# Patient Record
Sex: MALE | Race: WHITE | Employment: OTHER | URBAN - METROPOLITAN AREA
[De-identification: names, ages, dates, MRNs, and addresses within clinical notes are randomized per-mention and may not be internally consistent; named-entity substitution may affect disease eponyms.]

---

## 2020-01-01 ENCOUNTER — TRANSFERRED RECORDS (OUTPATIENT)
Dept: HEALTH INFORMATION MANAGEMENT | Facility: CLINIC | Age: 55
End: 2020-01-01

## 2020-01-01 ENCOUNTER — APPOINTMENT (OUTPATIENT)
Dept: CT IMAGING | Facility: CLINIC | Age: 55
DRG: 070 | End: 2020-01-01
Attending: INTERNAL MEDICINE
Payer: COMMERCIAL

## 2020-01-01 ENCOUNTER — PATIENT OUTREACH (OUTPATIENT)
Dept: CARE COORDINATION | Facility: CLINIC | Age: 55
End: 2020-01-01

## 2020-01-01 ENCOUNTER — APPOINTMENT (OUTPATIENT)
Dept: CT IMAGING | Facility: CLINIC | Age: 55
DRG: 070 | End: 2020-01-01
Attending: STUDENT IN AN ORGANIZED HEALTH CARE EDUCATION/TRAINING PROGRAM
Payer: COMMERCIAL

## 2020-01-01 ENCOUNTER — ANCILLARY PROCEDURE (OUTPATIENT)
Dept: CARDIOLOGY | Facility: CLINIC | Age: 55
DRG: 070 | End: 2020-01-01
Attending: INTERNAL MEDICINE
Payer: COMMERCIAL

## 2020-01-01 ENCOUNTER — APPOINTMENT (OUTPATIENT)
Dept: GENERAL RADIOLOGY | Facility: CLINIC | Age: 55
DRG: 070 | End: 2020-01-01
Attending: INTERNAL MEDICINE
Payer: COMMERCIAL

## 2020-01-01 ENCOUNTER — HOSPITAL ENCOUNTER (INPATIENT)
Facility: CLINIC | Age: 55
LOS: 1 days | Discharge: HOME | DRG: 070 | End: 2020-04-18
Attending: INTERNAL MEDICINE | Admitting: INTERNAL MEDICINE
Payer: COMMERCIAL

## 2020-01-01 VITALS
WEIGHT: 187.7 LBS | TEMPERATURE: 97.6 F | HEART RATE: 95 BPM | DIASTOLIC BLOOD PRESSURE: 79 MMHG | SYSTOLIC BLOOD PRESSURE: 101 MMHG | RESPIRATION RATE: 18 BRPM | OXYGEN SATURATION: 91 %

## 2020-01-01 DIAGNOSIS — L03.90 CELLULITIS, UNSPECIFIED CELLULITIS SITE: ICD-10-CM

## 2020-01-01 DIAGNOSIS — E11.9 TYPE 2 DIABETES MELLITUS WITHOUT COMPLICATION, UNSPECIFIED WHETHER LONG TERM INSULIN USE (H): Primary | ICD-10-CM

## 2020-01-01 DIAGNOSIS — R06.02 SHORTNESS OF BREATH: ICD-10-CM

## 2020-01-01 LAB
ABO + RH BLD: NORMAL
ABO + RH BLD: NORMAL
ACETAMINOPHEN QUAL: NEGATIVE
ALBUMIN SERPL-MCNC: 3 G/DL (ref 3.4–5)
ALBUMIN UR-MCNC: NEGATIVE MG/DL
ALP SERPL-CCNC: 143 U/L (ref 40–150)
ALT SERPL W P-5'-P-CCNC: 12 U/L (ref 0–70)
AMANTADINE: NEGATIVE
AMITRIPTYLINE QUAL: NEGATIVE
AMOXAPINE: NEGATIVE
AMPHETAMINES QUAL: NEGATIVE
ANION GAP SERPL CALCULATED.3IONS-SCNC: 2 MMOL/L (ref 3–14)
ANION GAP SERPL CALCULATED.3IONS-SCNC: 3 MMOL/L (ref 3–14)
ANION GAP SERPL CALCULATED.3IONS-SCNC: 6 MMOL/L (ref 3–14)
APPEARANCE UR: CLEAR
AST SERPL W P-5'-P-CCNC: 22 U/L (ref 0–45)
ATROPINE: NEGATIVE
BACTERIA SPEC CULT: NO GROWTH
BACTERIA SPEC CULT: NO GROWTH
BASE EXCESS BLDA CALC-SCNC: 11.3 MMOL/L
BASE EXCESS BLDV CALC-SCNC: 13.3 MMOL/L
BASOPHILS # BLD AUTO: 0.1 10E9/L (ref 0–0.2)
BASOPHILS NFR BLD AUTO: 1.7 %
BENZODIAZ UR QL: NEGATIVE
BILIRUB SERPL-MCNC: 0.5 MG/DL (ref 0.2–1.3)
BILIRUB UR QL STRIP: NEGATIVE
BLD GP AB SCN SERPL QL: NORMAL
BLOOD BANK CMNT PATIENT-IMP: NORMAL
BUN SERPL-MCNC: 20 MG/DL (ref 7–30)
BUN SERPL-MCNC: 22 MG/DL (ref 7–30)
BUN SERPL-MCNC: 29 MG/DL (ref 7–30)
BUPROPION QUAL: NEGATIVE
CAFFEINE QUAL: POSITIVE
CALCIUM SERPL-MCNC: 8.4 MG/DL (ref 8.5–10.1)
CALCIUM SERPL-MCNC: 8.6 MG/DL (ref 8.5–10.1)
CALCIUM SERPL-MCNC: 8.7 MG/DL (ref 8.5–10.1)
CANNABINOIDS UR QL SCN: NEGATIVE
CARBAMAZEPINE QUAL: NEGATIVE
CHLORIDE SERPL-SCNC: 100 MMOL/L (ref 94–109)
CHLORIDE SERPL-SCNC: 98 MMOL/L (ref 94–109)
CHLORIDE SERPL-SCNC: 99 MMOL/L (ref 94–109)
CHLORPHENIRAMINE: NEGATIVE
CHLORPROMAZINE: NEGATIVE
CITALOPRAM QUAL: NEGATIVE
CLOMIPRAMINE QUAL: NEGATIVE
CO2 SERPL-SCNC: 32 MMOL/L (ref 20–32)
CO2 SERPL-SCNC: 36 MMOL/L (ref 20–32)
CO2 SERPL-SCNC: 36 MMOL/L (ref 20–32)
COCAINE QUAL: NEGATIVE
COCAINE UR QL: NEGATIVE
CODEINE QUAL: NEGATIVE
COLOR UR AUTO: ABNORMAL
CREAT SERPL-MCNC: 1.28 MG/DL (ref 0.66–1.25)
CREAT SERPL-MCNC: 1.33 MG/DL (ref 0.66–1.25)
CREAT SERPL-MCNC: 1.47 MG/DL (ref 0.66–1.25)
CRP SERPL-MCNC: 20 MG/L (ref 0–8)
DESIPRAMINE QUAL: NEGATIVE
DEXTROMETHORPHAN: NEGATIVE
DIFFERENTIAL METHOD BLD: NORMAL
DIPHENHYDRAMINE: POSITIVE
DOXEPIN/METABOLITE: NEGATIVE
DOXYLAMINE: NEGATIVE
EJECTION FRACTION: NORMAL %
EOSINOPHIL # BLD AUTO: 0.1 10E9/L (ref 0–0.7)
EOSINOPHIL NFR BLD AUTO: 1.2 %
EPHEDRINE OR PSEUDO: NEGATIVE
ERYTHROCYTE [DISTWIDTH] IN BLOOD BY AUTOMATED COUNT: 17 % (ref 10–15)
ERYTHROCYTE [DISTWIDTH] IN BLOOD BY AUTOMATED COUNT: 17.2 % (ref 10–15)
FENTANYL QUAL: NEGATIVE
FERRITIN SERPL-MCNC: 44 NG/ML (ref 26–388)
FLUOXETINE AND METAB: NEGATIVE
GFR SERPL CREATININE-BSD FRML MDRD: 52 ML/MIN/{1.73_M2}
GFR SERPL CREATININE-BSD FRML MDRD: 59 ML/MIN/{1.73_M2}
GFR SERPL CREATININE-BSD FRML MDRD: 62 ML/MIN/{1.73_M2}
GLUCOSE BLDC GLUCOMTR-MCNC: 163 MG/DL (ref 70–99)
GLUCOSE BLDC GLUCOMTR-MCNC: 201 MG/DL (ref 70–99)
GLUCOSE BLDC GLUCOMTR-MCNC: 234 MG/DL (ref 70–99)
GLUCOSE BLDC GLUCOMTR-MCNC: 254 MG/DL (ref 70–99)
GLUCOSE BLDC GLUCOMTR-MCNC: 56 MG/DL (ref 70–99)
GLUCOSE BLDC GLUCOMTR-MCNC: 87 MG/DL (ref 70–99)
GLUCOSE SERPL-MCNC: 209 MG/DL (ref 70–99)
GLUCOSE SERPL-MCNC: 226 MG/DL (ref 70–99)
GLUCOSE SERPL-MCNC: 96 MG/DL (ref 70–99)
GLUCOSE UR STRIP-MCNC: NEGATIVE MG/DL
HCO3 BLD-SCNC: 37 MMOL/L (ref 21–28)
HCO3 BLDV-SCNC: 38 MMOL/L (ref 21–28)
HCT VFR BLD AUTO: 39.7 % (ref 40–53)
HCT VFR BLD AUTO: 40.9 % (ref 40–53)
HGB BLD-MCNC: 11.7 G/DL (ref 13.3–17.7)
HGB BLD-MCNC: 12 G/DL (ref 13.3–17.7)
HGB UR QL STRIP: ABNORMAL
HYDROCODONE QUAL: NEGATIVE
HYDROMORPHONE QUAL: NEGATIVE
IBUPROFEN QUAL: NEGATIVE
IMIPRAMINE QUAL: NEGATIVE
IMM GRANULOCYTES # BLD: 0 10E9/L (ref 0–0.4)
IMM GRANULOCYTES NFR BLD: 0.4 %
INR PPP: 2.24 (ref 0.86–1.14)
INR PPP: 2.4 (ref 0.86–1.14)
INTERPRETATION ECG - MUSE: NORMAL
KETAMINE QUAL: NEGATIVE
KETONES UR STRIP-MCNC: NEGATIVE MG/DL
LACTATE BLD-SCNC: 0.7 MMOL/L (ref 0.7–2)
LACTATE BLD-SCNC: 1.3 MMOL/L (ref 0.7–2)
LAMOTRIGINE QUAL: NEGATIVE
LEUKOCYTE ESTERASE UR QL STRIP: NEGATIVE
LIDOCAIN SPEC QL: NEGATIVE
LOXAPINE: NEGATIVE
LYMPHOCYTES # BLD AUTO: 1.3 10E9/L (ref 0.8–5.3)
LYMPHOCYTES NFR BLD AUTO: 15.5 %
Lab: NORMAL
MAGNESIUM SERPL-MCNC: 1.8 MG/DL (ref 1.6–2.3)
MAGNESIUM SERPL-MCNC: 2 MG/DL (ref 1.6–2.3)
MAPROTYLINE: NEGATIVE
MCH RBC QN AUTO: 24.9 PG (ref 26.5–33)
MCH RBC QN AUTO: 25.1 PG (ref 26.5–33)
MCHC RBC AUTO-ENTMCNC: 29.3 G/DL (ref 31.5–36.5)
MCHC RBC AUTO-ENTMCNC: 29.5 G/DL (ref 31.5–36.5)
MCV RBC AUTO: 85 FL (ref 78–100)
MCV RBC AUTO: 86 FL (ref 78–100)
MDC_IDC_LEAD_IMPLANT_DT: NORMAL
MDC_IDC_LEAD_IMPLANT_DT: NORMAL
MDC_IDC_LEAD_LOCATION: NORMAL
MDC_IDC_LEAD_LOCATION: NORMAL
MDC_IDC_LEAD_LOCATION_DETAIL_1: NORMAL
MDC_IDC_LEAD_LOCATION_DETAIL_1: NORMAL
MDC_IDC_LEAD_MFG: NORMAL
MDC_IDC_LEAD_MFG: NORMAL
MDC_IDC_LEAD_MODEL: NORMAL
MDC_IDC_LEAD_MODEL: NORMAL
MDC_IDC_LEAD_POLARITY_TYPE: NORMAL
MDC_IDC_LEAD_POLARITY_TYPE: NORMAL
MDC_IDC_LEAD_SERIAL: NORMAL
MDC_IDC_LEAD_SERIAL: NORMAL
MDC_IDC_LEAD_SPECIAL_FUNCTION: NORMAL
MDC_IDC_MSMT_BATTERY_DTM: NORMAL
MDC_IDC_MSMT_BATTERY_REMAINING_LONGEVITY: 12 MO
MDC_IDC_MSMT_BATTERY_STATUS: NORMAL
MDC_IDC_MSMT_CAP_CHARGE_DTM: NORMAL
MDC_IDC_MSMT_CAP_CHARGE_ENERGY: 41 J
MDC_IDC_MSMT_CAP_CHARGE_TIME: 10.6
MDC_IDC_MSMT_CAP_CHARGE_TIME: 12.37 S
MDC_IDC_MSMT_CAP_CHARGE_TYPE: NORMAL
MDC_IDC_MSMT_CAP_CHARGE_TYPE: NORMAL
MDC_IDC_MSMT_LEADCHNL_LV_IMPEDANCE_VALUE: 863 OHM
MDC_IDC_MSMT_LEADCHNL_LV_PACING_THRESHOLD_AMPLITUDE: 0.7 V
MDC_IDC_MSMT_LEADCHNL_LV_PACING_THRESHOLD_PULSEWIDTH: 0.5 MS
MDC_IDC_MSMT_LEADCHNL_LV_SENSING_INTR_AMPL: 6.8 MV
MDC_IDC_MSMT_LEADCHNL_RA_IMPEDANCE_VALUE: 568 OHM
MDC_IDC_MSMT_LEADCHNL_RA_PACING_THRESHOLD_AMPLITUDE: 0.6 V
MDC_IDC_MSMT_LEADCHNL_RA_PACING_THRESHOLD_PULSEWIDTH: 0.5 MS
MDC_IDC_MSMT_LEADCHNL_RA_SENSING_INTR_AMPL: 4.8 MV
MDC_IDC_MSMT_LEADCHNL_RV_IMPEDANCE_VALUE: 472 OHM
MDC_IDC_MSMT_LEADCHNL_RV_PACING_THRESHOLD_AMPLITUDE: 0.9 V
MDC_IDC_MSMT_LEADCHNL_RV_PACING_THRESHOLD_PULSEWIDTH: 0.6 MS
MDC_IDC_MSMT_LEADCHNL_RV_SENSING_INTR_AMPL: 6.6 MV
MDC_IDC_PG_IMPLANT_DTM: NORMAL
MDC_IDC_PG_MFG: NORMAL
MDC_IDC_PG_MODEL: NORMAL
MDC_IDC_PG_SERIAL: NORMAL
MDC_IDC_PG_TYPE: NORMAL
MDC_IDC_SESS_CLINIC_NAME: NORMAL
MDC_IDC_SESS_DTM: NORMAL
MDC_IDC_SESS_TYPE: NORMAL
MDC_IDC_SET_BRADY_AT_MODE_SWITCH_MODE: NORMAL
MDC_IDC_SET_BRADY_AT_MODE_SWITCH_RATE: 170 {BEATS}/MIN
MDC_IDC_SET_BRADY_HYSTRATE: NORMAL
MDC_IDC_SET_BRADY_LOWRATE: 60 {BEATS}/MIN
MDC_IDC_SET_BRADY_MAX_SENSOR_RATE: 130 {BEATS}/MIN
MDC_IDC_SET_BRADY_MAX_TRACKING_RATE: 130 {BEATS}/MIN
MDC_IDC_SET_BRADY_MODE: NORMAL
MDC_IDC_SET_BRADY_PAV_DELAY_HIGH: 160 MS
MDC_IDC_SET_BRADY_PAV_DELAY_LOW: 160 MS
MDC_IDC_SET_BRADY_SAV_DELAY_HIGH: 160 MS
MDC_IDC_SET_BRADY_SAV_DELAY_LOW: 160 MS
MDC_IDC_SET_CRT_LVRV_DELAY: 70 MS
MDC_IDC_SET_CRT_PACED_CHAMBERS: NORMAL
MDC_IDC_SET_LEADCHNL_LV_PACING_AMPLITUDE: 2.5 V
MDC_IDC_SET_LEADCHNL_LV_PACING_ANODE_ELECTRODE_1: NORMAL
MDC_IDC_SET_LEADCHNL_LV_PACING_ANODE_LOCATION_1: NORMAL
MDC_IDC_SET_LEADCHNL_LV_PACING_CAPTURE_MODE: NORMAL
MDC_IDC_SET_LEADCHNL_LV_PACING_CATHODE_ELECTRODE_1: NORMAL
MDC_IDC_SET_LEADCHNL_LV_PACING_CATHODE_LOCATION_1: NORMAL
MDC_IDC_SET_LEADCHNL_LV_PACING_POLARITY: NORMAL
MDC_IDC_SET_LEADCHNL_LV_PACING_PULSEWIDTH: 0.5 MS
MDC_IDC_SET_LEADCHNL_LV_SENSING_ADAPTATION_MODE: NORMAL
MDC_IDC_SET_LEADCHNL_LV_SENSING_ANODE_ELECTRODE_1: NORMAL
MDC_IDC_SET_LEADCHNL_LV_SENSING_ANODE_LOCATION_1: NORMAL
MDC_IDC_SET_LEADCHNL_LV_SENSING_CATHODE_ELECTRODE_1: NORMAL
MDC_IDC_SET_LEADCHNL_LV_SENSING_CATHODE_LOCATION_1: NORMAL
MDC_IDC_SET_LEADCHNL_LV_SENSING_POLARITY: NORMAL
MDC_IDC_SET_LEADCHNL_LV_SENSING_SENSITIVITY: 1 MV
MDC_IDC_SET_LEADCHNL_RA_PACING_AMPLITUDE: 2 V
MDC_IDC_SET_LEADCHNL_RA_PACING_CAPTURE_MODE: NORMAL
MDC_IDC_SET_LEADCHNL_RA_PACING_POLARITY: NORMAL
MDC_IDC_SET_LEADCHNL_RA_PACING_PULSEWIDTH: 0.5 MS
MDC_IDC_SET_LEADCHNL_RA_SENSING_ADAPTATION_MODE: NORMAL
MDC_IDC_SET_LEADCHNL_RA_SENSING_POLARITY: NORMAL
MDC_IDC_SET_LEADCHNL_RA_SENSING_SENSITIVITY: 0.25 MV
MDC_IDC_SET_LEADCHNL_RV_PACING_AMPLITUDE: 2.5 V
MDC_IDC_SET_LEADCHNL_RV_PACING_CAPTURE_MODE: NORMAL
MDC_IDC_SET_LEADCHNL_RV_PACING_POLARITY: NORMAL
MDC_IDC_SET_LEADCHNL_RV_PACING_PULSEWIDTH: 0.6 MS
MDC_IDC_SET_LEADCHNL_RV_SENSING_ADAPTATION_MODE: NORMAL
MDC_IDC_SET_LEADCHNL_RV_SENSING_POLARITY: NORMAL
MDC_IDC_SET_LEADCHNL_RV_SENSING_SENSITIVITY: 0.6 MV
MDC_IDC_SET_ZONE_DETECTION_INTERVAL: 300 MS
MDC_IDC_SET_ZONE_DETECTION_INTERVAL: 353 MS
MDC_IDC_SET_ZONE_TYPE: NORMAL
MDC_IDC_SET_ZONE_VENDOR_TYPE: NORMAL
MDC_IDC_STAT_BRADY_DTM_END: NORMAL
MDC_IDC_STAT_BRADY_DTM_START: NORMAL
MDC_IDC_STAT_BRADY_RA_PERCENT_PACED: 1 %
MDC_IDC_STAT_BRADY_RV_PERCENT_PACED: 95 %
MDC_IDC_STAT_CRT_LV_PERCENT_PACED: 98 %
MDC_IDC_STAT_EPISODE_RECENT_COUNT: 0
MDC_IDC_STAT_EPISODE_RECENT_COUNT: 1
MDC_IDC_STAT_EPISODE_RECENT_COUNT: 27
MDC_IDC_STAT_EPISODE_RECENT_COUNT_DTM_END: NORMAL
MDC_IDC_STAT_EPISODE_RECENT_COUNT_DTM_START: NORMAL
MDC_IDC_STAT_EPISODE_TOTAL_COUNT: 0
MDC_IDC_STAT_EPISODE_TOTAL_COUNT: 24
MDC_IDC_STAT_EPISODE_TOTAL_COUNT: 6656
MDC_IDC_STAT_EPISODE_TOTAL_COUNT_DTM_END: NORMAL
MDC_IDC_STAT_EPISODE_TYPE: NORMAL
MDC_IDC_STAT_EPISODE_VENDOR_TYPE: NORMAL
MDC_IDC_STAT_TACHYTHERAPY_ATP_DELIVERED_RECENT: 1
MDC_IDC_STAT_TACHYTHERAPY_ATP_DELIVERED_TOTAL: 22
MDC_IDC_STAT_TACHYTHERAPY_RECENT_DTM_END: NORMAL
MDC_IDC_STAT_TACHYTHERAPY_RECENT_DTM_START: NORMAL
MDC_IDC_STAT_TACHYTHERAPY_SHOCKS_ABORTED_RECENT: 0
MDC_IDC_STAT_TACHYTHERAPY_SHOCKS_ABORTED_TOTAL: 10
MDC_IDC_STAT_TACHYTHERAPY_SHOCKS_DELIVERED_RECENT: 0
MDC_IDC_STAT_TACHYTHERAPY_SHOCKS_DELIVERED_TOTAL: 5
MDC_IDC_STAT_TACHYTHERAPY_TOTAL_DTM_END: NORMAL
MDMA QUAL: NEGATIVE
MEPERIDINE QUAL: NEGATIVE
METHAMPHETAMINE: NEGATIVE
METHODONE QUAL: NEGATIVE
MIRTAZAPINE QUAL: NEGATIVE
MONOCYTES # BLD AUTO: 0.9 10E9/L (ref 0–1.3)
MONOCYTES NFR BLD AUTO: 10.7 %
MORPHINE QUAL: NEGATIVE
MUCOUS THREADS #/AREA URNS LPF: PRESENT /LPF
NEUTROPHILS # BLD AUTO: 6 10E9/L (ref 1.6–8.3)
NEUTROPHILS NFR BLD AUTO: 70.5 %
NICOTINE: NEGATIVE
NITRATE UR QL: NEGATIVE
NORTRIPTYLINE QUAL: NEGATIVE
NT-PROBNP SERPL-MCNC: 6218 PG/ML (ref 0–900)
O2/TOTAL GAS SETTING VFR VENT: ABNORMAL %
O2/TOTAL GAS SETTING VFR VENT: ABNORMAL %
OLANZAPINE QUAL: NEGATIVE
OPIATES UR QL SCN: NEGATIVE
OXYCODONE QUAL: NEGATIVE
PCO2 BLD: 56 MM HG (ref 35–45)
PCO2 BLDV: 49 MM HG (ref 40–50)
PENTAZOCINE: NEGATIVE
PH BLD: 7.44 PH (ref 7.35–7.45)
PH BLDV: 7.5 PH (ref 7.32–7.43)
PH UR STRIP: 8 PH (ref 5–7)
PHENCYCLIDINE QUAL: NEGATIVE
PHENTERMINE: NEGATIVE
PLATELET # BLD AUTO: 506 10E9/L (ref 150–450)
PLATELET # BLD AUTO: 514 10E9/L (ref 150–450)
PO2 BLD: 67 MM HG (ref 80–105)
PO2 BLDV: 37 MM HG (ref 25–47)
POTASSIUM SERPL-SCNC: 3.8 MMOL/L (ref 3.4–5.3)
POTASSIUM SERPL-SCNC: 4.1 MMOL/L (ref 3.4–5.3)
POTASSIUM SERPL-SCNC: 4.2 MMOL/L (ref 3.4–5.3)
PROCALCITONIN SERPL-MCNC: <0.05 NG/ML
PROPOFOL QUAL: NEGATIVE
PROPOXPHENE QUAL: NEGATIVE
PROPRANOLOL QUAL: NEGATIVE
PROT SERPL-MCNC: 6.4 G/DL (ref 6.8–8.8)
PYRILAMINE: NEGATIVE
QUETIAPINE METAB QUAL: NEGATIVE
RBC # BLD AUTO: 4.7 10E12/L (ref 4.4–5.9)
RBC # BLD AUTO: 4.78 10E12/L (ref 4.4–5.9)
RBC #/AREA URNS AUTO: 14 /HPF (ref 0–2)
SALICYLATE QUAL: NEGATIVE
SERTRALINE QUAL: NEGATIVE
SODIUM SERPL-SCNC: 137 MMOL/L (ref 133–144)
SODIUM SERPL-SCNC: 138 MMOL/L (ref 133–144)
SODIUM SERPL-SCNC: 138 MMOL/L (ref 133–144)
SOURCE: ABNORMAL
SP GR UR STRIP: 1.01 (ref 1–1.03)
SPECIMEN EXP DATE BLD: NORMAL
SPECIMEN SOURCE: NORMAL
SPECIMEN SOURCE: NORMAL
THEOBROMINE: POSITIVE
TOPIRAMATE QUAL: NEGATIVE
TRAMADOL QUAL: NEGATIVE
TRIMIPRAMINE QUAL: NEGATIVE
UROBILINOGEN UR STRIP-MCNC: NORMAL MG/DL (ref 0–2)
VENLAFAXINE QUAL: NEGATIVE
WBC # BLD AUTO: 8.6 10E9/L (ref 4–11)
WBC # BLD AUTO: 8.6 10E9/L (ref 4–11)
WBC #/AREA URNS AUTO: 0 /HPF (ref 0–5)

## 2020-01-01 PROCEDURE — 25000132 ZZH RX MED GY IP 250 OP 250 PS 637: Performed by: INTERNAL MEDICINE

## 2020-01-01 PROCEDURE — 83735 ASSAY OF MAGNESIUM: CPT | Performed by: INTERNAL MEDICINE

## 2020-01-01 PROCEDURE — 25000131 ZZH RX MED GY IP 250 OP 636 PS 637: Performed by: INTERNAL MEDICINE

## 2020-01-01 PROCEDURE — 84145 PROCALCITONIN (PCT): CPT | Performed by: INTERNAL MEDICINE

## 2020-01-01 PROCEDURE — 36415 COLL VENOUS BLD VENIPUNCTURE: CPT | Performed by: INTERNAL MEDICINE

## 2020-01-01 PROCEDURE — 25000125 ZZHC RX 250: Performed by: INTERNAL MEDICINE

## 2020-01-01 PROCEDURE — 71045 X-RAY EXAM CHEST 1 VIEW: CPT

## 2020-01-01 PROCEDURE — 25800030 ZZH RX IP 258 OP 636: Performed by: INTERNAL MEDICINE

## 2020-01-01 PROCEDURE — 86901 BLOOD TYPING SEROLOGIC RH(D): CPT | Performed by: INTERNAL MEDICINE

## 2020-01-01 PROCEDURE — 25000132 ZZH RX MED GY IP 250 OP 250 PS 637: Performed by: STUDENT IN AN ORGANIZED HEALTH CARE EDUCATION/TRAINING PROGRAM

## 2020-01-01 PROCEDURE — 36415 COLL VENOUS BLD VENIPUNCTURE: CPT | Performed by: STUDENT IN AN ORGANIZED HEALTH CARE EDUCATION/TRAINING PROGRAM

## 2020-01-01 PROCEDURE — 93284 PRGRMG EVAL IMPLANTABLE DFB: CPT | Mod: 26 | Performed by: INTERNAL MEDICINE

## 2020-01-01 PROCEDURE — 82728 ASSAY OF FERRITIN: CPT | Performed by: INTERNAL MEDICINE

## 2020-01-01 PROCEDURE — 86850 RBC ANTIBODY SCREEN: CPT | Performed by: INTERNAL MEDICINE

## 2020-01-01 PROCEDURE — 83605 ASSAY OF LACTIC ACID: CPT | Performed by: INTERNAL MEDICINE

## 2020-01-01 PROCEDURE — 86900 BLOOD TYPING SEROLOGIC ABO: CPT | Performed by: INTERNAL MEDICINE

## 2020-01-01 PROCEDURE — 85610 PROTHROMBIN TIME: CPT | Performed by: INTERNAL MEDICINE

## 2020-01-01 PROCEDURE — 94640 AIRWAY INHALATION TREATMENT: CPT

## 2020-01-01 PROCEDURE — 80307 DRUG TEST PRSMV CHEM ANLYZR: CPT | Performed by: STUDENT IN AN ORGANIZED HEALTH CARE EDUCATION/TRAINING PROGRAM

## 2020-01-01 PROCEDURE — 36600 WITHDRAWAL OF ARTERIAL BLOOD: CPT

## 2020-01-01 PROCEDURE — 87040 BLOOD CULTURE FOR BACTERIA: CPT | Performed by: INTERNAL MEDICINE

## 2020-01-01 PROCEDURE — 83735 ASSAY OF MAGNESIUM: CPT | Performed by: STUDENT IN AN ORGANIZED HEALTH CARE EDUCATION/TRAINING PROGRAM

## 2020-01-01 PROCEDURE — 80048 BASIC METABOLIC PNL TOTAL CA: CPT | Performed by: INTERNAL MEDICINE

## 2020-01-01 PROCEDURE — 85027 COMPLETE CBC AUTOMATED: CPT | Performed by: INTERNAL MEDICINE

## 2020-01-01 PROCEDURE — 25000128 H RX IP 250 OP 636: Performed by: STUDENT IN AN ORGANIZED HEALTH CARE EDUCATION/TRAINING PROGRAM

## 2020-01-01 PROCEDURE — 40000809 ZZH STATISTIC NO DOCUMENTATION TO SUPPORT CHARGE

## 2020-01-01 PROCEDURE — 40000275 ZZH STATISTIC RCP TIME EA 10 MIN

## 2020-01-01 PROCEDURE — 99238 HOSP IP/OBS DSCHRG MGMT 30/<: CPT | Mod: GC | Performed by: INTERNAL MEDICINE

## 2020-01-01 PROCEDURE — 70450 CT HEAD/BRAIN W/O DYE: CPT

## 2020-01-01 PROCEDURE — 93306 TTE W/DOPPLER COMPLETE: CPT | Mod: 26 | Performed by: INTERNAL MEDICINE

## 2020-01-01 PROCEDURE — 25000128 H RX IP 250 OP 636: Performed by: INTERNAL MEDICINE

## 2020-01-01 PROCEDURE — 93306 TTE W/DOPPLER COMPLETE: CPT

## 2020-01-01 PROCEDURE — 00000146 ZZHCL STATISTIC GLUCOSE BY METER IP

## 2020-01-01 PROCEDURE — 93284 PRGRMG EVAL IMPLANTABLE DFB: CPT

## 2020-01-01 PROCEDURE — 93010 ELECTROCARDIOGRAM REPORT: CPT | Performed by: INTERNAL MEDICINE

## 2020-01-01 PROCEDURE — 85004 AUTOMATED DIFF WBC COUNT: CPT | Performed by: INTERNAL MEDICINE

## 2020-01-01 PROCEDURE — 73700 CT LOWER EXTREMITY W/O DYE: CPT | Mod: LT

## 2020-01-01 PROCEDURE — 80048 BASIC METABOLIC PNL TOTAL CA: CPT | Performed by: STUDENT IN AN ORGANIZED HEALTH CARE EDUCATION/TRAINING PROGRAM

## 2020-01-01 PROCEDURE — 86140 C-REACTIVE PROTEIN: CPT | Performed by: INTERNAL MEDICINE

## 2020-01-01 PROCEDURE — 82803 BLOOD GASES ANY COMBINATION: CPT | Performed by: INTERNAL MEDICINE

## 2020-01-01 PROCEDURE — 83880 ASSAY OF NATRIURETIC PEPTIDE: CPT | Performed by: INTERNAL MEDICINE

## 2020-01-01 PROCEDURE — 81001 URINALYSIS AUTO W/SCOPE: CPT | Performed by: STUDENT IN AN ORGANIZED HEALTH CARE EDUCATION/TRAINING PROGRAM

## 2020-01-01 PROCEDURE — 93005 ELECTROCARDIOGRAM TRACING: CPT

## 2020-01-01 PROCEDURE — 40000556 ZZH STATISTIC PERIPHERAL IV START W US GUIDANCE

## 2020-01-01 PROCEDURE — 40000358 ZZHCL STATISTIC DRUG SCREEN MULTIPLE (METRO): Performed by: STUDENT IN AN ORGANIZED HEALTH CARE EDUCATION/TRAINING PROGRAM

## 2020-01-01 PROCEDURE — 80053 COMPREHEN METABOLIC PANEL: CPT | Performed by: INTERNAL MEDICINE

## 2020-01-01 PROCEDURE — 99221 1ST HOSP IP/OBS SF/LOW 40: CPT | Mod: 25 | Performed by: INTERNAL MEDICINE

## 2020-01-01 PROCEDURE — 21400000 ZZH R&B CCU UMMC

## 2020-01-01 RX ORDER — SERTRALINE HYDROCHLORIDE 100 MG/1
100 TABLET, FILM COATED ORAL DAILY
COMMUNITY

## 2020-01-01 RX ORDER — FERROUS SULFATE 325(65) MG
325 TABLET, DELAYED RELEASE (ENTERIC COATED) ORAL DAILY
Status: DISCONTINUED | OUTPATIENT
Start: 2020-01-01 | End: 2020-01-01

## 2020-01-01 RX ORDER — CLINDAMYCIN PHOSPHATE 900 MG/50ML
900 INJECTION, SOLUTION INTRAVENOUS EVERY 8 HOURS
Status: DISCONTINUED | OUTPATIENT
Start: 2020-01-01 | End: 2020-01-01

## 2020-01-01 RX ORDER — NYSTATIN 100000 [USP'U]/G
POWDER TOPICAL 2 TIMES DAILY PRN
COMMUNITY

## 2020-01-01 RX ORDER — LIDOCAINE 40 MG/G
CREAM TOPICAL
Status: DISCONTINUED | OUTPATIENT
Start: 2020-01-01 | End: 2020-01-01 | Stop reason: HOSPADM

## 2020-01-01 RX ORDER — POTASSIUM CHLORIDE 750 MG/1
10 TABLET, EXTENDED RELEASE ORAL DAILY
COMMUNITY

## 2020-01-01 RX ORDER — FUROSEMIDE 10 MG/ML
40 INJECTION INTRAMUSCULAR; INTRAVENOUS ONCE
Status: COMPLETED | OUTPATIENT
Start: 2020-01-01 | End: 2020-01-01

## 2020-01-01 RX ORDER — QUETIAPINE FUMARATE 50 MG/1
25 TABLET, FILM COATED ORAL AT BEDTIME
COMMUNITY

## 2020-01-01 RX ORDER — PIPERACILLIN SODIUM, TAZOBACTAM SODIUM 4; .5 G/20ML; G/20ML
4.5 INJECTION, POWDER, LYOPHILIZED, FOR SOLUTION INTRAVENOUS EVERY 8 HOURS
Status: DISCONTINUED | OUTPATIENT
Start: 2020-01-01 | End: 2020-01-01

## 2020-01-01 RX ORDER — SEMAGLUTIDE 1.34 MG/ML
1 INJECTION, SOLUTION SUBCUTANEOUS
Status: ON HOLD | COMMUNITY
End: 2020-01-01

## 2020-01-01 RX ORDER — METOPROLOL SUCCINATE 25 MG/1
12.5 TABLET, EXTENDED RELEASE ORAL DAILY
COMMUNITY

## 2020-01-01 RX ORDER — ALBUTEROL SULFATE 90 UG/1
2 AEROSOL, METERED RESPIRATORY (INHALATION) EVERY 4 HOURS PRN
COMMUNITY

## 2020-01-01 RX ORDER — PANTOPRAZOLE SODIUM 40 MG/1
40 TABLET, DELAYED RELEASE ORAL DAILY
COMMUNITY

## 2020-01-01 RX ORDER — WARFARIN SODIUM 2 MG/1
3.5 TABLET ORAL DAILY
Status: ON HOLD | COMMUNITY
End: 2020-01-01

## 2020-01-01 RX ORDER — PIPERACILLIN SODIUM, TAZOBACTAM SODIUM 4; .5 G/20ML; G/20ML
4.5 INJECTION, POWDER, LYOPHILIZED, FOR SOLUTION INTRAVENOUS ONCE
Status: COMPLETED | OUTPATIENT
Start: 2020-01-01 | End: 2020-01-01

## 2020-01-01 RX ORDER — POTASSIUM CHLORIDE 750 MG/1
10 TABLET, EXTENDED RELEASE ORAL DAILY
Status: DISCONTINUED | OUTPATIENT
Start: 2020-01-01 | End: 2020-01-01 | Stop reason: HOSPADM

## 2020-01-01 RX ORDER — DIPHENHYDRAMINE HYDROCHLORIDE 50 MG/ML
25 INJECTION INTRAMUSCULAR; INTRAVENOUS ONCE
Status: COMPLETED | OUTPATIENT
Start: 2020-01-01 | End: 2020-01-01

## 2020-01-01 RX ORDER — OXYCODONE HYDROCHLORIDE 5 MG/1
5 TABLET ORAL
Status: DISCONTINUED | OUTPATIENT
Start: 2020-01-01 | End: 2020-01-01

## 2020-01-01 RX ORDER — TRAZODONE HYDROCHLORIDE 100 MG/1
100 TABLET ORAL AT BEDTIME
COMMUNITY

## 2020-01-01 RX ORDER — PANTOPRAZOLE SODIUM 40 MG/1
40 TABLET, DELAYED RELEASE ORAL DAILY
Status: DISCONTINUED | OUTPATIENT
Start: 2020-01-01 | End: 2020-01-01 | Stop reason: HOSPADM

## 2020-01-01 RX ORDER — TIOTROPIUM BROMIDE 18 UG/1
18 CAPSULE ORAL; RESPIRATORY (INHALATION) DAILY
COMMUNITY

## 2020-01-01 RX ORDER — SENNOSIDES 8.6 MG
1 TABLET ORAL 2 TIMES DAILY
COMMUNITY

## 2020-01-01 RX ORDER — FUROSEMIDE 10 MG/ML
40 INJECTION INTRAMUSCULAR; INTRAVENOUS ONCE
Status: DISCONTINUED | OUTPATIENT
Start: 2020-01-01 | End: 2020-01-01

## 2020-01-01 RX ORDER — ALBUTEROL SULFATE 0.83 MG/ML
2.5 SOLUTION RESPIRATORY (INHALATION) EVERY 4 HOURS PRN
Status: DISCONTINUED | OUTPATIENT
Start: 2020-01-01 | End: 2020-01-01 | Stop reason: HOSPADM

## 2020-01-01 RX ORDER — ALLOPURINOL 300 MG/1
300 TABLET ORAL DAILY
Status: DISCONTINUED | OUTPATIENT
Start: 2020-01-01 | End: 2020-01-01 | Stop reason: HOSPADM

## 2020-01-01 RX ORDER — HYDROXYZINE HYDROCHLORIDE 25 MG/1
25 TABLET, FILM COATED ORAL 3 TIMES DAILY PRN
Status: DISCONTINUED | OUTPATIENT
Start: 2020-01-01 | End: 2020-01-01 | Stop reason: HOSPADM

## 2020-01-01 RX ORDER — MELATONIN 10 MG
1 CAPSULE ORAL AT BEDTIME
COMMUNITY

## 2020-01-01 RX ORDER — TIOTROPIUM BROMIDE 18 UG/1
18 CAPSULE ORAL; RESPIRATORY (INHALATION) DAILY
Status: DISCONTINUED | OUTPATIENT
Start: 2020-01-01 | End: 2020-01-01

## 2020-01-01 RX ORDER — SENNOSIDES 8.6 MG
1 TABLET ORAL 2 TIMES DAILY
Status: DISCONTINUED | OUTPATIENT
Start: 2020-01-01 | End: 2020-01-01 | Stop reason: HOSPADM

## 2020-01-01 RX ORDER — SERTRALINE HYDROCHLORIDE 100 MG/1
100 TABLET, FILM COATED ORAL DAILY
Status: DISCONTINUED | OUTPATIENT
Start: 2020-01-01 | End: 2020-01-01

## 2020-01-01 RX ORDER — TAMSULOSIN HYDROCHLORIDE 0.4 MG/1
0.4 CAPSULE ORAL DAILY
Status: DISCONTINUED | OUTPATIENT
Start: 2020-01-01 | End: 2020-01-01 | Stop reason: HOSPADM

## 2020-01-01 RX ORDER — SERTRALINE HYDROCHLORIDE 100 MG/1
100 TABLET, FILM COATED ORAL DAILY
Status: DISCONTINUED | OUTPATIENT
Start: 2020-01-01 | End: 2020-01-01 | Stop reason: HOSPADM

## 2020-01-01 RX ORDER — NALOXONE HYDROCHLORIDE 0.4 MG/ML
0.4 INJECTION, SOLUTION INTRAMUSCULAR; INTRAVENOUS; SUBCUTANEOUS ONCE
Status: COMPLETED | OUTPATIENT
Start: 2020-01-01 | End: 2020-01-01

## 2020-01-01 RX ORDER — TORSEMIDE 10 MG/1
10 TABLET ORAL DAILY
Status: ON HOLD | COMMUNITY
End: 2020-01-01

## 2020-01-01 RX ORDER — FERROUS SULFATE 325(65) MG
325 TABLET ORAL DAILY
Status: DISCONTINUED | OUTPATIENT
Start: 2020-01-01 | End: 2020-01-01 | Stop reason: HOSPADM

## 2020-01-01 RX ORDER — DOXYCYCLINE 100 MG/1
100 CAPSULE ORAL EVERY 12 HOURS
Qty: 12 CAPSULE | Refills: 0 | Status: SHIPPED | OUTPATIENT
Start: 2020-01-01 | End: 2020-01-01

## 2020-01-01 RX ORDER — PANTOPRAZOLE SODIUM 40 MG/1
40 TABLET, DELAYED RELEASE ORAL DAILY
Status: DISCONTINUED | OUTPATIENT
Start: 2020-01-01 | End: 2020-01-01

## 2020-01-01 RX ORDER — TAMSULOSIN HYDROCHLORIDE 0.4 MG/1
0.4 CAPSULE ORAL DAILY
COMMUNITY

## 2020-01-01 RX ORDER — BUDESONIDE 1 MG/2ML
1 INHALANT ORAL 2 TIMES DAILY
Status: DISCONTINUED | OUTPATIENT
Start: 2020-01-01 | End: 2020-01-01 | Stop reason: HOSPADM

## 2020-01-01 RX ORDER — FENTANYL CITRATE 50 UG/ML
25 INJECTION, SOLUTION INTRAMUSCULAR; INTRAVENOUS
Status: COMPLETED | OUTPATIENT
Start: 2020-01-01 | End: 2020-01-01

## 2020-01-01 RX ORDER — ATORVASTATIN CALCIUM 40 MG/1
40 TABLET, FILM COATED ORAL DAILY
COMMUNITY

## 2020-01-01 RX ORDER — PIPERACILLIN SODIUM, TAZOBACTAM SODIUM 4; .5 G/20ML; G/20ML
4.5 INJECTION, POWDER, LYOPHILIZED, FOR SOLUTION INTRAVENOUS EVERY 6 HOURS
Status: DISCONTINUED | OUTPATIENT
Start: 2020-01-01 | End: 2020-01-01

## 2020-01-01 RX ORDER — DOXYCYCLINE 100 MG/1
100 CAPSULE ORAL EVERY 12 HOURS SCHEDULED
Status: DISCONTINUED | OUTPATIENT
Start: 2020-01-01 | End: 2020-01-01 | Stop reason: HOSPADM

## 2020-01-01 RX ORDER — ALLOPURINOL 300 MG/1
300 TABLET ORAL DAILY
COMMUNITY

## 2020-01-01 RX ORDER — NICOTINE 21 MG/24HR
1 PATCH, TRANSDERMAL 24 HOURS TRANSDERMAL DAILY
Status: DISCONTINUED | OUTPATIENT
Start: 2020-01-01 | End: 2020-01-01 | Stop reason: HOSPADM

## 2020-01-01 RX ORDER — BUDESONIDE 1 MG/2ML
1 INHALANT ORAL 2 TIMES DAILY
COMMUNITY

## 2020-01-01 RX ORDER — DEXTROSE MONOHYDRATE 25 G/50ML
25-50 INJECTION, SOLUTION INTRAVENOUS
Status: DISCONTINUED | OUTPATIENT
Start: 2020-01-01 | End: 2020-01-01 | Stop reason: HOSPADM

## 2020-01-01 RX ORDER — POLYETHYLENE GLYCOL 3350 17 G/17G
1 POWDER, FOR SOLUTION ORAL DAILY PRN
COMMUNITY

## 2020-01-01 RX ORDER — FUROSEMIDE 20 MG
60 TABLET ORAL DAILY
Qty: 30 TABLET | Refills: 3 | Status: SHIPPED | OUTPATIENT
Start: 2020-01-01

## 2020-01-01 RX ORDER — GABAPENTIN 300 MG/1
600 CAPSULE ORAL 2 TIMES DAILY
COMMUNITY

## 2020-01-01 RX ORDER — ACETAMINOPHEN 325 MG/1
650 TABLET ORAL EVERY 4 HOURS PRN
Status: DISCONTINUED | OUTPATIENT
Start: 2020-01-01 | End: 2020-01-01 | Stop reason: HOSPADM

## 2020-01-01 RX ORDER — HYDROXYZINE HYDROCHLORIDE 25 MG/1
25 TABLET, FILM COATED ORAL EVERY 6 HOURS PRN
COMMUNITY

## 2020-01-01 RX ORDER — ALLOPURINOL 300 MG/1
300 TABLET ORAL DAILY
Status: DISCONTINUED | OUTPATIENT
Start: 2020-01-01 | End: 2020-01-01

## 2020-01-01 RX ORDER — FERROUS SULFATE 325(65) MG
325 TABLET, DELAYED RELEASE (ENTERIC COATED) ORAL DAILY
COMMUNITY

## 2020-01-01 RX ORDER — ACETAMINOPHEN 325 MG/1
650 TABLET ORAL EVERY 4 HOURS PRN
Status: DISCONTINUED | OUTPATIENT
Start: 2020-01-01 | End: 2020-01-01

## 2020-01-01 RX ORDER — FUROSEMIDE 40 MG
40 TABLET ORAL DAILY
Status: DISCONTINUED | OUTPATIENT
Start: 2020-01-01 | End: 2020-01-01

## 2020-01-01 RX ORDER — ATORVASTATIN CALCIUM 40 MG/1
40 TABLET, FILM COATED ORAL DAILY
Status: DISCONTINUED | OUTPATIENT
Start: 2020-01-01 | End: 2020-01-01

## 2020-01-01 RX ORDER — LORAZEPAM 0.5 MG/1
0.5 TABLET ORAL
Status: COMPLETED | OUTPATIENT
Start: 2020-01-01 | End: 2020-01-01

## 2020-01-01 RX ORDER — QUETIAPINE FUMARATE 25 MG/1
25 TABLET, FILM COATED ORAL AT BEDTIME
Status: DISCONTINUED | OUTPATIENT
Start: 2020-01-01 | End: 2020-01-01 | Stop reason: HOSPADM

## 2020-01-01 RX ORDER — POLYETHYLENE GLYCOL 3350 17 G
2 POWDER IN PACKET (EA) ORAL
Status: DISCONTINUED | OUTPATIENT
Start: 2020-01-01 | End: 2020-01-01 | Stop reason: HOSPADM

## 2020-01-01 RX ORDER — WARFARIN SODIUM 2 MG/1
3 TABLET ORAL DAILY
Start: 2020-01-01

## 2020-01-01 RX ORDER — ATORVASTATIN CALCIUM 40 MG/1
40 TABLET, FILM COATED ORAL DAILY
Status: DISCONTINUED | OUTPATIENT
Start: 2020-01-01 | End: 2020-01-01 | Stop reason: HOSPADM

## 2020-01-01 RX ORDER — POTASSIUM CHLORIDE 750 MG/1
10 TABLET, EXTENDED RELEASE ORAL DAILY
Status: DISCONTINUED | OUTPATIENT
Start: 2020-01-01 | End: 2020-01-01

## 2020-01-01 RX ORDER — TAMSULOSIN HYDROCHLORIDE 0.4 MG/1
0.4 CAPSULE ORAL DAILY
Status: DISCONTINUED | OUTPATIENT
Start: 2020-01-01 | End: 2020-01-01

## 2020-01-01 RX ORDER — ACETAMINOPHEN 325 MG/1
650 TABLET ORAL EVERY 4 HOURS PRN
COMMUNITY

## 2020-01-01 RX ORDER — NICOTINE POLACRILEX 4 MG
15-30 LOZENGE BUCCAL
Status: DISCONTINUED | OUTPATIENT
Start: 2020-01-01 | End: 2020-01-01 | Stop reason: HOSPADM

## 2020-01-01 RX ADMIN — UMECLIDINIUM 1 PUFF: 62.5 AEROSOL, POWDER ORAL at 08:34

## 2020-01-01 RX ADMIN — DIPHENHYDRAMINE HYDROCHLORIDE 25 MG: 50 INJECTION INTRAMUSCULAR; INTRAVENOUS at 15:49

## 2020-01-01 RX ADMIN — QUETIAPINE 25 MG: 25 TABLET ORAL at 20:33

## 2020-01-01 RX ADMIN — TAMSULOSIN HYDROCHLORIDE 0.4 MG: 0.4 CAPSULE ORAL at 08:33

## 2020-01-01 RX ADMIN — POTASSIUM CHLORIDE 10 MEQ: 750 TABLET, EXTENDED RELEASE ORAL at 08:33

## 2020-01-01 RX ADMIN — Medication 12.5 MG: at 08:33

## 2020-01-01 RX ADMIN — BUDESONIDE 1 MG: 1 SUSPENSION RESPIRATORY (INHALATION) at 19:19

## 2020-01-01 RX ADMIN — HYDROXYZINE HYDROCHLORIDE 25 MG: 25 TABLET ORAL at 12:01

## 2020-01-01 RX ADMIN — ACETAMINOPHEN 650 MG: 325 TABLET ORAL at 23:36

## 2020-01-01 RX ADMIN — FUROSEMIDE 60 MG: 40 TABLET ORAL at 11:34

## 2020-01-01 RX ADMIN — INSULIN GLARGINE 10 UNITS: 100 INJECTION, SOLUTION SUBCUTANEOUS at 22:43

## 2020-01-01 RX ADMIN — HYDROXYZINE HYDROCHLORIDE 25 MG: 25 TABLET ORAL at 23:20

## 2020-01-01 RX ADMIN — ALLOPURINOL 300 MG: 300 TABLET ORAL at 08:33

## 2020-01-01 RX ADMIN — BUDESONIDE 1 MG: 1 SUSPENSION RESPIRATORY (INHALATION) at 07:30

## 2020-01-01 RX ADMIN — SACUBITRIL AND VALSARTAN 1 TABLET: 24; 26 TABLET, FILM COATED ORAL at 08:33

## 2020-01-01 RX ADMIN — SENNOSIDES 1 TABLET: 8.6 TABLET, FILM COATED ORAL at 20:16

## 2020-01-01 RX ADMIN — PANTOPRAZOLE SODIUM 40 MG: 40 TABLET, DELAYED RELEASE ORAL at 08:33

## 2020-01-01 RX ADMIN — WARFARIN SODIUM 3.5 MG: 2.5 TABLET ORAL at 18:53

## 2020-01-01 RX ADMIN — NALOXONE HYDROCHLORIDE 0.4 MG: 0.4 INJECTION, SOLUTION INTRAMUSCULAR; INTRAVENOUS; SUBCUTANEOUS at 12:46

## 2020-01-01 RX ADMIN — LORAZEPAM 0.5 MG: 0.5 TABLET ORAL at 20:33

## 2020-01-01 RX ADMIN — NICOTINE 1 PATCH: 21 PATCH TRANSDERMAL at 23:21

## 2020-01-01 RX ADMIN — PIPERACILLIN AND TAZOBACTAM 4.5 G: 4; .5 INJECTION, POWDER, FOR SOLUTION INTRAVENOUS at 20:35

## 2020-01-01 RX ADMIN — SERTRALINE HYDROCHLORIDE 100 MG: 100 TABLET ORAL at 08:33

## 2020-01-01 RX ADMIN — FERROUS SULFATE TAB 325 MG (65 MG ELEMENTAL FE) 325 MG: 325 (65 FE) TAB at 11:34

## 2020-01-01 RX ADMIN — INSULIN ASPART 2 UNITS: 100 INJECTION, SOLUTION INTRAVENOUS; SUBCUTANEOUS at 08:32

## 2020-01-01 RX ADMIN — FLUTICASONE FUROATE AND VILANTEROL TRIFENATATE 1 PUFF: 100; 25 POWDER RESPIRATORY (INHALATION) at 08:34

## 2020-01-01 RX ADMIN — PIPERACILLIN AND TAZOBACTAM 4.5 G: 4; .5 INJECTION, POWDER, FOR SOLUTION INTRAVENOUS at 02:02

## 2020-01-01 RX ADMIN — ATORVASTATIN CALCIUM 40 MG: 40 TABLET, FILM COATED ORAL at 08:33

## 2020-01-01 RX ADMIN — PIPERACILLIN AND TAZOBACTAM 4.5 G: 4; .5 INJECTION, POWDER, FOR SOLUTION INTRAVENOUS at 14:50

## 2020-01-01 RX ADMIN — FENTANYL CITRATE 25 MCG: 50 INJECTION, SOLUTION INTRAMUSCULAR; INTRAVENOUS at 05:12

## 2020-01-01 RX ADMIN — FUROSEMIDE 40 MG: 10 INJECTION, SOLUTION INTRAMUSCULAR; INTRAVENOUS at 14:50

## 2020-01-01 RX ADMIN — ACETAMINOPHEN 650 MG: 325 TABLET ORAL at 04:10

## 2020-01-01 RX ADMIN — VANCOMYCIN HYDROCHLORIDE 2250 MG: 1 INJECTION, POWDER, LYOPHILIZED, FOR SOLUTION INTRAVENOUS at 16:14

## 2020-01-01 RX ADMIN — SACUBITRIL AND VALSARTAN 1 TABLET: 24; 26 TABLET, FILM COATED ORAL at 20:16

## 2020-01-01 RX ADMIN — DOXYCYCLINE HYCLATE 100 MG: 100 CAPSULE ORAL at 14:27

## 2020-01-01 RX ADMIN — PIPERACILLIN AND TAZOBACTAM 4.5 G: 4; .5 INJECTION, POWDER, FOR SOLUTION INTRAVENOUS at 08:33

## 2020-01-01 ASSESSMENT — ACTIVITIES OF DAILY LIVING (ADL)
RETIRED_EATING: 0-->INDEPENDENT
ADLS_ACUITY_SCORE: 13
COGNITION: 0 - NO COGNITION ISSUES REPORTED
RETIRED_COMMUNICATION: 0-->UNDERSTANDS/COMMUNICATES WITHOUT DIFFICULTY
ADLS_ACUITY_SCORE: 13
DRESS: 0-->INDEPENDENT
WHICH_OF_THE_ABOVE_FUNCTIONAL_RISKS_HAD_A_RECENT_ONSET_OR_CHANGE?: AMBULATION;TRANSFERRING
TOILETING: 0-->INDEPENDENT
ADLS_ACUITY_SCORE: 15
BATHING: 0-->INDEPENDENT
ADLS_ACUITY_SCORE: 22
TRANSFERRING: 0-->INDEPENDENT
AMBULATION: 0-->INDEPENDENT
ADLS_ACUITY_SCORE: 22
FALL_HISTORY_WITHIN_LAST_SIX_MONTHS: NO
ADLS_ACUITY_SCORE: 13
ADLS_ACUITY_SCORE: 13
ADLS_ACUITY_SCORE: 15
SWALLOWING: 0-->SWALLOWS FOODS/LIQUIDS WITHOUT DIFFICULTY

## 2020-01-01 ASSESSMENT — PAIN DESCRIPTION - DESCRIPTORS
DESCRIPTORS: CONSTANT
DESCRIPTORS: PRESSURE

## 2020-04-17 PROBLEM — R06.02 SHORTNESS OF BREATH: Status: ACTIVE | Noted: 2020-01-01

## 2020-04-17 NOTE — H&P
Cardiology History and Physical  Frank Maldonado MRN: [id number]  Age: 54 year old, : 1965  Primary care provider: Bandar Hawkins            Assessment and Plan:     In summary, this is a 54-year-old gentleman with a past medical history notable for nonischemic cardiomyopathy per documentation secondary to alcohol use, a mechanical AVR for an unclear reason with a history of valve thrombosis, hypertension, hyperlipidemia, and type 2 diabetes mellitus who is presenting to us as a transfer from [facility] for further evaluation of decompensated heart failure.    Acute encephalopathy: Per documentation at the outside hospital, the patient was not encephalopathic prior to his transfer.  On arrival here, he is difficult to arouse.  Neurologic exam is nonfocal.  ABG obtained on admission was notable for a metabolic alkalosis with respiratory compensation.  pH was normal.  CT head was also obtained which per preliminary review, shows no acute findings.  Suggest that the etiology of this is secondary to medication induced in the setting of fentanyl use and lorazepam use.  - We will attempt Narcan  - Close observation of the patient with serial VBG's to ensure adequate minute ventilation  - Holding all narcotics, benzodiazepines, home trazodone, home gabapentin, and home Atarax    Acute on chronic heart failure with reduced ejection fraction/nonischemic cardiomyopathy: NYHA IV, ACC D per history.  The patient reportedly had a history of cardiogenic shock in 2020 and was discharged on hospice.  Per documentation, the patient's etiology of heart failure is alcohol use.  It is unclear who his primary cardiologist is/was.  Attempts to reach his PCP were unsuccessful.  Based on hospital documentation, the patient presented with symptoms of volume overload including weight gain, abdominal distention, and shortness of breath.  He was given IV furosemide.  Weight on 4/15 was 202 (unknown baseline)  -  Attempt to obtain further records on this patient  - Echocardiogram  - Volume status: Appears hypervolemic, will give an additional 40 mg of IV Lasix  - Goal-directed medical therapy: Continue home Toprol 12.5 mg and Entresto 24/26 twice daily  - Afterload reduction: No additional agents  - Inotropic/mechanical support: The patient reportedly had an IABP in place in February 2020.  He had an unknown known cardiac output at that time.  His normal lactate and LFTs on admission are not consistent with cardiogenic shock.  - We will discuss with patient further once encephalopathy resolves regarding his goals of care  - Could potentially consider [place]-Sparkle catheter placement once adequately diuresed to assess output    Cellulitis: Patient is presenting with right lower extremity wounds with associated cellulitis. Ct scan without abscess  -Zosyn as monotherapy for now  -Blood cultures obtained    Type 2 diabetes mellitus: Home regimen is insulin 70/30. Will give 10 U Lantus and sliding scale insulin. Titrate PRN.  Holding home semaglutide.    COPD: Continue home Advair, Pulmicort, and Spiriva.  Depression: Continue home sertraline  Sleeping disorder: Continue home quetiapine.  Holding home Atarax and trazodone given encephalopathy.  Hyperuricemia: Continue home allopurinol  Hyperlipidemia: Continue home atorvastatin  Hypertension: Continue home metoprolol and Entresto, as above  Chronic pain: Holding home gabapentin in the setting of encephalopathy.  History of mechanical AVR: History of valve thrombosis. Continue warfarin with goal INR 2-3.    FEN: cardiac diet, 2 L fluid restriction  PPX: therapeutic INR therefore no DVT ppx needed    Code Status: DNR/DNI per documentation, though unable to confirm    Patient discussed with staff attending, Dr. Reynolds.    Kasi Trinidad MD  Cardiology Fellow  Pager: [phone]            Chief Complaint:     Volume overload          History of Present Illness:     The patient is a  54-year-old man with a past medical history notable for nonischemic cardiomyopathy secondary to alcohol use, mechanical AVR for unknown reason with a history of valve thrombosis, paroxysmal atrial fibrillation, type 2 diabetes mellitus, former tobacco abuse now with COPD, hypertension, hyperlipidemia, history of traumatic below-knee amputation, chronic pain, and depression who is presenting as a transfer from [facility] for evaluation of decompensated heart failure.    On arrival, the patient is somnolent and therefore the entirety of the history is obtained via the notes that were sent.    Per chart review, the patient was hospitalized in February 2020 for cardiogenic shock.  It appears as though he had an intra-aortic balloon pump placed and a [place]-Sparkle catheter.  Per documentation, he was discharged on hospice.  He presented to the emergency department on 4/15 with complaints of shortness of breath, abdominal distention, and fluid retention. He felt that his oral torsemide was no longer working. He had reportedly signed himself out of hospice services.  He was admitted and given IV Lasix for diuresis. He was also given fentanyl for abdominal pain.    Lactate was normal on admission.  LFTs were normal on admission.  Serum creatinine was 1.2 with an unknown baseline.  Troponin I was 22.4 (upper limit of normal is 19.8).  BNP was 2605 (upper limit of normal 100).  Alcohol level is undetectable.  Chest x-ray showed bilateral pleural effusion, left greater than right.    In addition to diuretics, the patient was also noted to be agitated and therefore was given multiple doses of fentanyl and Ativan prior to transfer.  On arrival, the patient was somnolent and difficult to arouse.  ABG showed a compensated respiratory acidosis.  CT head was emergently obtained.    The outside hospital documents his weight is 202 pounds on the early morning of 4/15. He is listed as DNR/DNI. There are no family contacts.  Attempts to reach PCP were unsuccessful.          Past Medical History:     The patient has a past medical history notable for type 2 diabetes mellitus, heart failure with reduced ejection fraction noted to be secondary to alcohol use, history of a mechanical aortic valve replacement with a history of valve thrombosis, paroxysmal atrial fibrillation, former tobacco abuse, COPD, hypertension, hyperlipidemia, chronic pain, and depression          Past Surgical History:      Traumatic BKA          Social History:     Social History     Socioeconomic History     Marital status: Single     Spouse name: Not on file     Number of children: Not on file     Years of education: Not on file     Highest education level: Not on file   Occupational History     Not on file   Social Needs     Financial resource strain: Not on file     Food insecurity     Worry: Not on file     Inability: Not on file     Transportation needs     Medical: Not on file     Non-medical: Not on file   Tobacco Use     Smoking status: Not on file   Substance and Sexual Activity     Alcohol use: Not on file     Drug use: Not on file     Sexual activity: Not on file   Lifestyle     Physical activity     Days per week: Not on file     Minutes per session: Not on file     Stress: Not on file   Relationships     Social connections     Talks on phone: Not on file     Gets together: Not on file     Attends [employer] service: Not on file     Active member of club or organization: Not on file     Attends meetings of clubs or organizations: Not on file     Relationship status: Not on file     Intimate partner violence     Fear of current or ex partner: Not on file     Emotionally abused: Not on file     Physically abused: Not on file     Forced sexual activity: Not on file   Other Topics Concern     Not on file   Social History Narrative     Not on file              Family History:     Mother and father with a history of heart disease, father with a history of  stroke          Allergies:     Allergies   Allergen Reactions     Demerol [Meperidine] Nausea and Vomiting     Morphine      Violent behavior     Prinivil [Lisinopril]      Sexual dysfunction     Vancomycin Rash              Medications:     Medications Prior to Admission   Medication Sig Dispense Refill Last Dose     acetaminophen (TYLENOL) 325 MG tablet Take 650 mg by mouth every 4 hours as needed for mild pain        albuterol (PROAIR HFA/PROVENTIL HFA/VENTOLIN HFA) 108 (90 Base) MCG/ACT inhaler Inhale 2 puffs into the lungs every 4 hours as needed for shortness of breath / dyspnea or wheezing        allopurinol (ZYLOPRIM) 300 MG tablet Take 300 mg by mouth daily        atorvastatin (LIPITOR) 40 MG tablet Take 40 mg by mouth daily        budesonide (PULMICORT) 1 MG/2ML neb solution Take 1 mg by nebulization 2 times daily        ferrous sulfate (FE TABS) 325 (65 Fe) MG EC tablet Take 325 mg by mouth daily        fluticasone-salmeterol (ADVAIR) 100-50 MCG/DOSE inhaler Inhale 1 puff into the lungs every 12 hours        gabapentin (NEURONTIN) 300 MG capsule Take 600 mg by mouth 2 times daily        hydrOXYzine (ATARAX) 25 MG tablet Take 25 mg by mouth every 6 hours as needed for itching or anxiety        insulin NPH-Regular 70/30 (NOVOLIN 70/30 FLEXPEN) susp Inject 35 Units Subcutaneous daily (with breakfast)         insulin NPH-Regular 70/30 (NOVOLIN 70/30 FLEXPEN) susp Inject 25 Units Subcutaneous daily (with dinner)        Melatonin 10 MG CAPS Take 1 capsule by mouth At Bedtime        metoprolol succinate ER (TOPROL-XL) 25 MG 24 hr tablet Take 12.5 mg by mouth daily        nystatin (NYSTOP) 959136 UNIT/GM external powder Apply topically 2 times daily as needed        pantoprazole (PROTONIX) 40 MG EC tablet Take 40 mg by mouth daily        polyethylene glycol (MIRALAX) 17 g packet Take 1 packet by mouth daily as needed for constipation        potassium chloride ER (KLOR-CON M) 10 MEQ CR tablet Take 10 mEq by mouth  daily        QUEtiapine (SEROQUEL) 50 MG tablet Take 25 mg by mouth At Bedtime        sacubitril-valsartan (ENTRESTO) 24-26 MG per tablet Take 1 tablet by mouth 2 times daily        semaglutide (OZEMPIC, 1 MG/DOSE,) 2 MG/1.5ML pen Inject 1 mg Subcutaneous every 7 days On Sundays        sennosides (SENOKOT) 8.6 MG tablet Take 1 tablet by mouth 2 times daily        sertraline (ZOLOFT) 100 MG tablet Take 100 mg by mouth daily        tamsulosin (FLOMAX) 0.4 MG capsule Take 0.4 mg by mouth daily        tiotropium (SPIRIVA) 18 MCG inhaled capsule Inhale 18 mcg into the lungs daily        torsemide (DEMADEX) 10 MG tablet Take 10 mg by mouth daily        traZODone (DESYREL) 100 MG tablet Take 100 mg by mouth At Bedtime        warfarin ANTICOAGULANT (COUMADIN) 2 MG tablet Take 3.5 mg by mouth daily                       Physical Exam:     B/P: 104/76, T: 98.2, P: 96, R: 16    Wt Readings from Last 4 Encounters:   No data found for Wt         Intake/Output Summary (Last 24 hours) at 4/17/2020 1322  Last data filed at 4/17/2020 1200  Gross per 24 hour   Intake --   Output 1500 ml   Net -1500 ml       Gen: Somnolent, difficult to arouse  HEENT: JVP 10-12 cm, no icterus  PULM/THORAX: Diminished bibasilarly, limited by patient effort  CV: RRR, mechanical aortic valve, no extrasystoles  ABD: Soft, NTND, protuberant abdomen  EXT: WWP. Trace LE edema  NEURO: difficult to arouse          Data:     Labs Reviewed on Admission: reviewed in HPI.        Most Recent Imaging:     EKG: BIV pacing    Echo 4/17/20:     - admission TTE pending    CXR 4/17/20: pulmonary edema

## 2020-04-17 NOTE — LETTER
Transition Communication Hand-off for Care Transitions to Next Level of Care Provider    Name: Frank Maldonado  : 1965  MRN #: [id number]  Primary Care Provider: Bandar Hawkins     Primary Clinic: [facility] [address] [place] [id number]     Reason for Hospitalization:  Biventricular Heart Failure  Shortness of breath  Admit Date/Time: 2020 10:36 AM  Discharge Date: 20  Payor Source: Payor: HUMANA / Plan: HU[employer]NA MEDICARE ADVANTAGE / Product Type: Medicare /        Reason for Communication Hand-off Referral: Difficulty understanding plan of care  No support or lacking a support system  Multiple providers/specialties    Discharge Plan:  Frank transferred to [facility] from [facility] [place] in [place], [place]. He requested a second opinion and other options for his cardiac concerns. He had been enrolled with [facility] 3/ but discharge at his request to pursue treatment. He is discharge back to his home today and no longer wishes to remain in the hospital. [facility] is aware and will follow-up with him Monday to discuss their services. He is unsure at this point whether he wishes to get more treatment options/continue aggressive treatment vs re-admit to hospice at home.                BEKAH Ramos    AVS/Discharge Summary is the source of truth; this is a helpful guide for improved communication of patient story

## 2020-04-17 NOTE — PHARMACY-ADMISSION MEDICATION HISTORY
Admission medication history interview status for the 4/17/2020 admission is complete. See Epic admission navigator for allergy information, pharmacy, prior to admission medications and immunization status.     Medication history interview sources:  [facility] medical records    Changes made to PTA medication list (reason)  Added: All PTA medications were added  Deleted: N/A  Changed: N/A    Additional medication history information (including reliability of information, actions taken by pharmacist):  -Patient transferred from [facility] in [place], [place] 4/17/20  -Of note, he has been getting furosemide 80mg BID while inpatient with last dose 4/16 @ 2015  -His last doses of Entresto and gabapentin were also 4/16 @ 2015  -He has also been getting lorazepam 1mg and fentanyl 50mcg q4h as needed at OSH with last dose of lorazepam 4/17 @ 0330 and last dose of fentanyl 4/17 @ 0215  -At OSH, they were managing his BG with Lantus 10 units at bedtime and Humalog sliding scale insulin  -Patient's PTA warfarin regimen was 3.5mg daily    Prior to Admission medications    Medication Sig Last Dose Taking? Auth Provider   acetaminophen (TYLENOL) 325 MG tablet Take 650 mg by mouth every 4 hours as needed for mild pain  Yes Unknown, Entered By History   albuterol (PROAIR HFA/PROVENTIL HFA/VENTOLIN HFA) 108 (90 Base) MCG/ACT inhaler Inhale 2 puffs into the lungs every 4 hours as needed for shortness of breath / dyspnea or wheezing  Yes Unknown, Entered By History   allopurinol (ZYLOPRIM) 300 MG tablet Take 300 mg by mouth daily  Yes Unknown, Entered By History   atorvastatin (LIPITOR) 40 MG tablet Take 40 mg by mouth daily  Yes Unknown, Entered By History   budesonide (PULMICORT) 1 MG/2ML neb solution Take 1 mg by nebulization 2 times daily  Yes Unknown, Entered By History   ferrous sulfate (FE TABS) 325 (65 Fe) MG EC tablet Take 325 mg by mouth daily  Yes Unknown, Entered By History   fluticasone-salmeterol  (ADVAIR) 100-50 MCG/DOSE inhaler Inhale 1 puff into the lungs every 12 hours  Yes Unknown, Entered By History   gabapentin (NEURONTIN) 300 MG capsule Take 600 mg by mouth 2 times daily  Yes Unknown, Entered By History   hydrOXYzine (ATARAX) 25 MG tablet Take 25 mg by mouth every 6 hours as needed for itching or anxiety  Yes Unknown, Entered By History   insulin NPH-Regular 70/30 (NOVOLIN 70/30 FLEXPEN) susp Inject 35 Units Subcutaneous daily (with breakfast)   Yes Unknown, Entered By History   insulin NPH-Regular 70/30 (NOVOLIN 70/30 FLEXPEN) susp Inject 25 Units Subcutaneous daily (with dinner)  Yes Unknown, Entered By History   Melatonin 10 MG CAPS Take 1 capsule by mouth At Bedtime  Yes Unknown, Entered By History   metoprolol succinate ER (TOPROL-XL) 25 MG 24 hr tablet Take 12.5 mg by mouth daily  Yes Unknown, Entered By History   nystatin (NYSTOP) 973472 UNIT/GM external powder Apply topically 2 times daily as needed  Yes Unknown, Entered By History   pantoprazole (PROTONIX) 40 MG EC tablet Take 40 mg by mouth daily  Yes Unknown, Entered By History   polyethylene glycol (MIRALAX) 17 g packet Take 1 packet by mouth daily as needed for constipation  Yes Unknown, Entered By History   potassium chloride ER (KLOR-CON M) 10 MEQ CR tablet Take 10 mEq by mouth daily  Yes Unknown, Entered By History   QUEtiapine (SEROQUEL) 50 MG tablet Take 25 mg by mouth At Bedtime  Yes Unknown, Entered By History   sacubitril-valsartan (ENTRESTO) 24-26 MG per tablet Take 1 tablet by mouth 2 times daily  Yes Unknown, Entered By History   semaglutide (OZEMPIC, 1 MG/DOSE,) 2 MG/1.5ML pen Inject 1 mg Subcutaneous every 7 days On Sundays  Yes Unknown, Entered By History   sennosides (SENOKOT) 8.6 MG tablet Take 1 tablet by mouth 2 times daily  Yes Unknown, Entered By History   sertraline (ZOLOFT) 100 MG tablet Take 100 mg by mouth daily  Yes Unknown, Entered By History   tamsulosin (FLOMAX) 0.4 MG capsule Take 0.4 mg by mouth daily  Yes  Unknown, Entered By History   tiotropium (SPIRIVA) 18 MCG inhaled capsule Inhale 18 mcg into the lungs daily  Yes Unknown, Entered By History   torsemide (DEMADEX) 10 MG tablet Take 10 mg by mouth daily  Yes Unknown, Entered By History   traZODone (DESYREL) 100 MG tablet Take 100 mg by mouth At Bedtime  Yes Unknown, Entered By History   warfarin ANTICOAGULANT (COUMADIN) 2 MG tablet Take 3.5 mg by mouth daily  Yes Unknown, Entered By History     Medication history completed by:  Wendy Aguillon, Pharm D  April 17, 2020

## 2020-04-17 NOTE — PHARMACY-ANTICOAGULATION SERVICE
Clinical Pharmacy - Warfarin Dosing Consult     Pharmacy has been consulted to manage this patient s warfarin therapy.  Indication: Mechanical Aortic Valve Replacement  Therapy Goal: INR 2-3  Provider/Team: Shailesh Okeefe Clinic: Unsure - possibly somewhere in [place]  Warfarin Prior to Admission: Yes  Warfarin PTA Regimen: 3.5mg daily (per medication history note)  Significant drug interactions: No new drug-drug interactions  Recent documented change in oral intake/nutrition: Unknown    INR   Date Value Ref Range Status   04/17/2020 2.40 (H) 0.86 - 1.14 Final   05/13/2005 1.04 0.86 - 1.14 Final       Recommend warfarin  3.5 mg today.  Pharmacy will monitor Frank Maldonado daily and order warfarin doses to achieve specified goal.      Please contact pharmacy as soon as possible if the warfarin needs to be held for a procedure or if the warfarin goals change.

## 2020-04-18 NOTE — DISCHARGE SUMMARY
[facility]   Cardiology Discharge Summary  Discharge Summary     Frank Maldonado MRN# [id number]   YOB: 1965 Age: 54 year old     DATE OF ADMISSION:  4/17/2020  DATE OF DISCHARGE:     4/18/2020  ADMITTING PROVIDER: Rodolfo Reynolds MD  DISCHARGE PROVIDER: Dr. Reynolds   PRIMARY PROVIDER: Bandar Hawkins         Reason for Admission:   Frank Maldonado is a 54-year-old gentleman with a past medical history notable for nonischemic cardiomyopathy per documentation secondary to alcohol use, a mechanical AVR for an unclear reason with a history of valve thrombosis, hypertension, hyperlipidemia, and type 2 diabetes mellitus who presented to us as a transfer from [facility] for further evaluation of decompensated heart failure. He was previously on hospice and presented to a hospital in [place] for dyspnea, lower extremity edema, volume overload. He mentioned that he is not sure if he wants to go back on hospice and wanted to be admitted for IV diuresis. Given his severe cardiomyopathy (EF ~15%), he was transferred here for evaluation for advanced therapies. Patient was lethargic on arrival due to fentanyl given at OSH for leg pain, he improved with Narcan here. Please see H+P from 4/17 for more details.           Discharge Diagnosis:   # Metabolic Encephalopathy  # Acute on chronic heart failure with reduced ejection fraction/nonischemic cardiomyopathy  # Cellulitis  # Type 2 diabetes mellitus         Follow Up:   - Primary care doctor early next week (~4/20-/4/22) regarding re-enrolling in hospice if this is what patient decides. Otherwise, if he wants to continue medical management for his heart failure, he can follow up with his Cardiologist in [place].   - INR check on 4/20           Hospital Course by Problem:    # Metabolic Encephalopathy- resolved   Per documentation at the outside hospital, the patient was not encephalopathic prior to his transfer.  On arrival here, he was  difficult to arouse.  Neurologic exam was nonfocal.  ABG was notable for a metabolic alkalosis with respiratory compensation, pH was normal.  CT head showed no acute findings. He improved with Narcan and time.  Suggest that the etiology of this is secondary to medication induced in the setting of fentanyl use and lorazepam use. He cleared overnight and when he woke up, he was confused why he was in [place]. He is oriented to person, place, time. No focal deficits.      # Acute on chronic heart failure with reduced ejection fraction/nonischemic cardiomyopathy   NYHA IV, ACC D per history.  The patient reportedly had a history of cardiogenic shock in February 2020 and was discharged on hospice.  Per documentation, the patient's etiology of heart failure is alcohol use. He reports going to OSH ED two days ago for IV diuresis- he felt short of breath, swelling in legs, weight gain, abdominal distension. Per report from OSH- he told them that he was not sure if he wants to be on hospice so he was admitted for IV diuresis and transferred here for advanced heart failure therapies evaluation. Today, the patient clearly stated to multiple team members that he does not want an LVAD or heart transplant even if these improved his life expectancy. He understands that he has advanced, end stage heart failure and is contemplating between continuing medical management vs re-enrolling in hospice. He was very upset that he was transferred here from [place]. He states that he went to the hospital in [place] for IV diuretics with plan to discharge back home after. He insisted on being discharged today despite our suggestion that we continue IV diuretics.  He plans to follow up with his PCP and if interested in resuming hospice, his PCP can help to arrange. Hospice agency is aware patient may resume their services and they plan to reach out to patient and his family on Monday. If he decides not to re-enroll in hospice, he can  follow up with his Cardiologist in [place] (he reports seeing a Cardiologist twice a year at [facility], they can help manage his CHF meds). SW helped with setting up a ride for patient as family was unable to come pick him up. Patient understands that the ride will not be covered by insurance and he plans to pay  when he gets dropped off.   - ICD interrogation 4/17: nl (of note, his ICD was not turned off when he went on hospice. We will leave the device as is and if he is to re-enroll in hospice, he can further discuss with the hospice agency)   - TTE 4/17: EF 10-15%, mild to moderate RV dilation, dilated IVC > 2.1cm  - Weight on 4/15 at OSH was 202 (unknown baseline), on discharge today is 187  - Volume status: hypervolemic, urinated 4L with Lasix 40mg IV (PTA Lasix dose was 40mg daily). Discharged on Lasix 60mg daily, titrate up as needed outpatient   - Goal-directed medical therapy: Continue home Toprol 12.5 mg and Entresto 24/26 twice daily  - Afterload reduction: No additional agents     # Cellulitis of RLE  Right leg, improved with Zosyn. BCxs NGTD.   - Doxycyline for total 7 day course      # Type 2 diabetes mellitus  - Resume home regimen insulin 70/30  - Follow up with PCP outpatient regarding insulin regimen. He had one low blood glucose at 56 on admission but this was in the setting of having received insulin with poor po intake from encephalopathy. His blood glucose were in 200s on day of discharge, his appetite is also back to his baseline      COPD: Continue home Advair, Pulmicort, and Spiriva.  Depression: Continue home sertraline  Sleeping disorder: Continue home quetiapine, atarax, trazodone   Hyperuricemia: Continue home allopurinol  Hyperlipidemia: Continue home atorvastatin  Hypertension: Continue home metoprolol and Entresto, as above  Chronic pain: Continue gabapentin   History of mechanical AVR: History of valve thrombosis. Continue warfarin with goal INR  2-3.      Physical Exam on day of Discharge:  Blood pressure 96/89, pulse 96, temperature 97.7  F (36.5  C), temperature source Oral, resp. rate 16, weight 85.1 kg (187 lb 11.2 oz), SpO2 91 %.    General: NAD, laying in bed comfortably   HEENT: EOMI, anicteric sclera   CV: RRR, nl S1/S2, no S3/S4 appreciated, no m/r/g; JVP ~18cm  Lungs: CTAB, no wheezing/crackles, no cough  Abd: soft, non-distended, non-tender, normoactive bowel sounds   Ext: left lower extremity s/p amputation with stump, right leg with improved erythema, 1+ edema   Neuro: AAOx3, No focal neurologic deficits           Discharge Medications:     Current Discharge Medication List      START taking these medications    Details   doxycycline hyclate (VIBRAMYCIN) 100 MG capsule Take 1 capsule (100 mg) by mouth every 12 hours for 6 days  Qty: 12 capsule, Refills: 0    Associated Diagnoses: Cellulitis, unspecified cellulitis site      furosemide (LASIX) 20 MG tablet Take 3 tablets (60 mg) by mouth daily  Qty: 30 tablet, Refills: 3    Associated Diagnoses: Shortness of breath      insulin NPH-Regular 70/30 (70-30) 100 UNIT/ML vial Resume previous dose: 35 units with breakfast, 25 units with dinner  Qty:      Associated Diagnoses: Type 2 diabetes mellitus without complication, unspecified whether long term insulin use (H)         CONTINUE these medications which have CHANGED    Details   warfarin ANTICOAGULANT (COUMADIN) 2 MG tablet Take 1.5 tablets (3 mg) by mouth daily    Associated Diagnoses: Cellulitis, unspecified cellulitis site         CONTINUE these medications which have NOT CHANGED    Details   acetaminophen (TYLENOL) 325 MG tablet Take 650 mg by mouth every 4 hours as needed for mild pain      albuterol (PROAIR HFA/PROVENTIL HFA/VENTOLIN HFA) 108 (90 Base) MCG/ACT inhaler Inhale 2 puffs into the lungs every 4 hours as needed for shortness of breath / dyspnea or wheezing    Comments: Pharmacy may dispense brand covered by insurance (Proair, or  proventil or ventolin or generic albuterol inhaler)      allopurinol (ZYLOPRIM) 300 MG tablet Take 300 mg by mouth daily      atorvastatin (LIPITOR) 40 MG tablet Take 40 mg by mouth daily      budesonide (PULMICORT) 1 MG/2ML neb solution Take 1 mg by nebulization 2 times daily      ferrous sulfate (FE TABS) 325 (65 Fe) MG EC tablet Take 325 mg by mouth daily      fluticasone-salmeterol (ADVAIR) 100-50 MCG/DOSE inhaler Inhale 1 puff into the lungs every 12 hours      gabapentin (NEURONTIN) 300 MG capsule Take 600 mg by mouth 2 times daily      hydrOXYzine (ATARAX) 25 MG tablet Take 25 mg by mouth every 6 hours as needed for itching or anxiety      Melatonin 10 MG CAPS Take 1 capsule by mouth At Bedtime      metoprolol succinate ER (TOPROL-XL) 25 MG 24 hr tablet Take 12.5 mg by mouth daily      nystatin (NYSTOP) 907936 UNIT/GM external powder Apply topically 2 times daily as needed      pantoprazole (PROTONIX) 40 MG EC tablet Take 40 mg by mouth daily      polyethylene glycol (MIRALAX) 17 g packet Take 1 packet by mouth daily as needed for constipation      potassium chloride ER (KLOR-CON M) 10 MEQ CR tablet Take 10 mEq by mouth daily      QUEtiapine (SEROQUEL) 50 MG tablet Take 25 mg by mouth At Bedtime      sacubitril-valsartan (ENTRESTO) 24-26 MG per tablet Take 1 tablet by mouth 2 times daily      sennosides (SENOKOT) 8.6 MG tablet Take 1 tablet by mouth 2 times daily      sertraline (ZOLOFT) 100 MG tablet Take 100 mg by mouth daily      tamsulosin (FLOMAX) 0.4 MG capsule Take 0.4 mg by mouth daily      tiotropium (SPIRIVA) 18 MCG inhaled capsule Inhale 18 mcg into the lungs daily      traZODone (DESYREL) 100 MG tablet Take 100 mg by mouth At Bedtime         STOP taking these medications       insulin NPH-Regular 70/30 (NOVOLIN 70/30 FLEXPEN) susp Comments:   Reason for Stopping:         insulin NPH-Regular 70/30 (NOVOLIN 70/30 FLEXPEN) susp Comments:   Reason for Stopping:         semaglutide (OZEMPIC, 1  MG/DOSE,) 2 MG/1.5ML pen Comments:   Reason for Stopping:         torsemide (DEMADEX) 10 MG tablet Comments:   Reason for Stopping:                    Discharge Instructions and Follow-Up:     Discharge Procedure Orders   INR   Standing Status: Future Standing Exp. Date: 06/17/20     Reason for your hospital stay   Order Comments: You were transferred from an other hospital for volume overload. You improved with IV diuretics. You had told us that you would not want a heart transplant or heart pump for your heart failure. You wanted to go home today on diuretics and plan to follow up with resuming Hospice next week.     Activity   Order Comments: Your activity upon discharge: activity as tolerated     Order Specific Question Answer Comments   Is discharge order? Yes      When to contact your care team   Order Comments: Discuss with your Hospice team if you decide to re-enroll in the program. They can adjust your diuretics to help with your shortness of breath and extra volume     Discharge Instructions   Order Comments: - See discharge medication list for up to date list of medications  - Follow up with Primary Care Doctor early next week (4/20-4/22), discuss going back on Hospice if this is something that you would want. If you decide not to do hospice, you should follow up with your Cardiologist in [place]     Follow Up and recommended labs and tests   Order Comments: - Follow up with your primary care doctor early next week (4/20-4/22)  - Discuss resuming Hospice with your primary care doctor if this is something that you are still interested in for symptom management  - INR on 4/20, follow up with your Coumadin clinic regarding is needs dose adjustment     DNR/DNI     Order Specific Question Answer Comments   Code status determined by: Discussion with patient/legal decision maker      Diet   Order Comments: Follow this diet upon discharge: Less than two grams sodium and two liters of fluid daily     Order  Specific Question Answer Comments   Is discharge order? Yes               Condition on Discharge:     Discharge condition: Stable   Code status on discharge: DNR / DNI        Date of service: 4/18/2020    The patient was discussed with Dr. Reynolds.    Jamey Young MD PGY-3  Pager: [phone]

## 2020-04-18 NOTE — PLAN OF CARE
"Shift summary [id number]  D:Pt admitted for advanced heart failure therapy per sending OSH. Pt verbalized he does not want any advanced therapy but \"did not want to drowned in his own secretions\" and \"couldn't be admitted to hospital if in hospic \".  A/I: Pt found upon first assessment to have a FSBG of 56(check done after already eating chicken breast and bo. Chip cookie and small juice) pt given juices and FSBG eventually up to 166 . Pt had been very lethargic and difficult to keep awake. Pt began waking up after fed  hamburger,FF and cookies for dinner. Pt started c/o anxiety and demanding something to help. MD's called to come evaluate as pt had not been very alert upon arrival. MD's came and evaluated  . Pt was made DNR/DNI and medicated with ativan and Seroquel. Pt's 02 sats on 3.5 l 02 have been 90-93 % Pt without 02 dropped to 70's. Pt continues to take off 02 periodically so continuous 02 sat monitoring applied. Pt has rutledge catheter which put out almost 4+ liters since arrival. Electrolytes have remained stable. Pt's right leg is reddened and sore. Pt started on IV antibiotics . Pt's left leg with BKA, intact without redness Pt insisted on getting up to BR with artificial leg and attempted without help, got into BR very unsteadily and was assisted back. Pt placed on bed alarm. Pt has become increasingly restless and anxious as night has progressed. Pt has 2 PIV SL.Pt is a current smoker will need to monitor closely. Pt also reports he had a cell Phone upon admission and unable to find . No belongings inventory obtained prior to writer. Pt found to have only clothing,prosthetic leg, cane and shoe. Will follow up with previous nurse Aster M tomorrow.  P: Continue to monitor resp status,continue diuresis, update MD's with concerns, likely discharge in 2-3 days.;  "

## 2020-04-18 NOTE — PROGRESS NOTES
"Social Work Services Discharge Note      Patient Name:  Frank Maldonado     Anticipated Discharge Date:  04/18/20    Discharge Disposition:   Other:  home with follow-up with hospice on Monday to discuss whether he wishes to re-enroll vs seek more treatment options.     [facility], main phone [phone] fax [phone] - ANGELICA will fax discharge summary to them.     Following MD:  PCP     Pre-Admission Screening (PAS) online form has been completed.  The Level of Care (LOC) is:  Determined  Confirmation Code is:  Not applicable.  Patient/caregiver informed of referral to [facility] for Pre-Admission Screening for skilled nursing facility (SNF) placement and to expect a phone call post discharge from SNF.     Additional Services/Equipment Arranged:  Private pay transportation through [employer] #[phone] with estimated cost $535 due today via cash or check. ANGELICA informed patient this is private pay due to today once they drop him at home, he agreed and plans to pay by check.      Patient / Family response to discharge plan:  agreeable     Persons notified of above discharge plan:  Bedside RN, cardiology team, patient, patient's brother Ricky  Family - brother Ricky Maldonado work phone [phone] or cell [phone]    Staff Discharge Instructions:  Please print a packet and send with patient.     CTS Handoff completed:  YES    Medicare Notice of Rights provided to the patient/family:  YES    Frank Maldonado is a 54 year old male transferred to [facility] from [place], [place] for further evaluation of decompensated heart failure. He had been previously enrolled in home hospice but discharge in order to be further evaluated. Per cardiology team, he is now requesting a home discharge and re-enrollment in hospice services. Per bedside RN, Frank is on room air, no oxygen.    ANGELICA spoke to Frank via phone to discuss his disposition. He stated, \"I just want to go home now.\" Inquired if he had further " "questions for his medical team and his understanding of prognosis/options. He stated several times, \"I don't know, I just want to go home on hospice.\" He typically resides alone in [place], had been on hospice. He reports ability to manage his own ADLs, has a walker and prosthetic leg (left leg BKA). Notes he had home oxygen and used it \"sometimes\" but not concerned about using it for transport home. He typically drives himself. Denies any family, friends or neighbors involved in his care but later agreeable to SW attempting to reach his brother, Ricky. We discussed transportation (w/c vs stretcher), would likely be private pay and he does not have the ability to afford this.     SW contact Ricky through his work phone and discussed plan of care. He may be available to provide transportation Sunday evening, if he is able to get a vehicle. He is aware that Frank was on hospice, last saw him 4 days ago. Updated him with Frank's room phone and RN station phone number so he may contact his brother. Family later spoke to medical team and said not able to transport, do not feel comfortable coming here, they are not a transportation option.     SW spoke to [facility] team. Per their records, Frank admitted to hospice 3/11/20 and discharged himself from home hospice on 4/14. During their time with him, DME included commode, walker and home O2 (prn 2-4L). He was officially discharge from hospice, per their hospice doctor, Dr. Peck, they CANNOT re-admit patient to hospice over the weekend. They require a family conference on Monday with hospice RN, hospice RN, patient and his family to discuss goals of care and expectations before they consider re-admission to hospice. They noted that we can discharge patient to home if patient agreeable and our medical team in agreement and they will follow-up on Monday.     Sri Senior, ADAMARIS, [facility]  Social Work Services/Care Management, Casual staff  [facility]  on-call/after hours pager [phone]      For weekend social work needs, contact information below and avail on [employer]:  4A, 4C, 4E, 5A, 5B pager [phone]  6A, 6B, 6C, 6D  pager [phone]  7A, 7B, 7C, 7D, 5C pager [phone]    For weekend RN care coordinator needs (home d/c with needs incl home care, assisted living facility returns, durable medical equip, IV antibiotics) - page via job code [id number]

## 2020-04-18 NOTE — PLAN OF CARE
Neuro: A&Ox4. Anxious. Frustrated.   Cardiac: VSS. Paced- Tele removed by pt   Respiratory: Sating 91% on RA. Uses 3L at home intermittently.   GI/: Adequate urine output. BM X1  Activity:  Assist of 1/ SBA and cane   Pain: C/O pain at left stump- rash present. Declined tylenol.   Skin: No new deficits noted. Rash left stump  LDA's:  removed    Plan: Continue with POC. Notify primary team with changes. Discharge- ride will call when downstairs around 1600. discharge medications already picked up and packed in belonging bags.   Reviewed discharge instructions and signed.

## 2020-04-18 NOTE — PLAN OF CARE
D: Pt transferred from [facility] for evaluation of decompensated HF, was previously on hospice care at home. Hx of NICM s/t etoh abuse, mechanical AVR with history of thrombosis, HTN, HLD, and DM2.    I/A: Vital signs stable, afebrile. Refused tele monitoring from [id number]. V paced when on tele. Refusing oxygen and O2 monitoring (spot checks >90%). PRN tylenol given x2, once for chest pain and once for leg pain. Pt irritable at start of shift, but more agreeable as shift progressed. Bunn in place with adequate urine output, UA and drug tox sent. Slept off and on between cares. PRN atarax given for anxiety. Nicotine patch applied to L shoulder. Up with assist of 1-2 with gait belt and cane to bathroom.     P: Possible discharge back home today on hospice. Continue to monitor and notify MD with pertinent changes.     Addendum: Pt more talkative this AM, voiced he is willing to hear about advanced heart failure therapies as long as it doesn't involve LVAD.